# Patient Record
Sex: FEMALE | NOT HISPANIC OR LATINO | URBAN - METROPOLITAN AREA
[De-identification: names, ages, dates, MRNs, and addresses within clinical notes are randomized per-mention and may not be internally consistent; named-entity substitution may affect disease eponyms.]

---

## 2018-12-23 ENCOUNTER — OFFICE VISIT (OUTPATIENT)
Dept: RETAIL CLINIC | Facility: CLINIC | Age: 28
End: 2018-12-23

## 2018-12-23 VITALS
RESPIRATION RATE: 18 BRPM | TEMPERATURE: 98.6 F | OXYGEN SATURATION: 100 % | HEART RATE: 68 BPM | DIASTOLIC BLOOD PRESSURE: 60 MMHG | SYSTOLIC BLOOD PRESSURE: 110 MMHG

## 2018-12-23 DIAGNOSIS — H57.11 EYE PAIN, RIGHT: Primary | ICD-10-CM

## 2018-12-23 PROCEDURE — 99203 OFFICE O/P NEW LOW 30 MIN: CPT | Performed by: NURSE PRACTITIONER

## 2018-12-23 RX ORDER — PRENATAL VIT/IRON FUM/FOLIC AC 27MG-0.8MG
TABLET ORAL DAILY
COMMUNITY

## 2018-12-23 RX ORDER — POLYMYXIN B SULFATE AND TRIMETHOPRIM 1; 10000 MG/ML; [USP'U]/ML
1 SOLUTION OPHTHALMIC EVERY 4 HOURS
Qty: 10 ML | Refills: 0 | Status: SHIPPED | OUTPATIENT
Start: 2018-12-23 | End: 2018-12-27

## 2018-12-23 NOTE — PROGRESS NOTES
"Subjective   Eugene Clifton is a 28 y.o. female.     Eye Pain    The right eye is affected. The current episode started yesterday. The problem has been gradually worsening. Injury mechanism: baby \"poked finger in eye\" last night. The pain is at a severity of 3/10. The pain is mild. There is no known exposure to pink eye. She does not wear contacts. Associated symptoms include an eye discharge. Pertinent negatives include no blurred vision, itching or recent URI. She has tried nothing for the symptoms.        The following portions of the patient's history were reviewed and updated as appropriate: allergies, current medications, past family history, past medical history, past social history, past surgical history and problem list.    Review of Systems   HENT: Negative.    Eyes: Positive for pain and discharge. Negative for blurred vision and itching.   Respiratory: Negative.    Cardiovascular: Negative.        Objective   Physical Exam   Constitutional: She is cooperative. No distress.   HENT:   Head: Normocephalic.   Right Ear: Hearing, tympanic membrane, external ear and ear canal normal.   Left Ear: Hearing, tympanic membrane, external ear and ear canal normal.   Nose: Nose normal.   Mouth/Throat: Oropharynx is clear and moist.   Eyes: EOM and lids are normal. Pupils are equal, round, and reactive to light. Right eye exhibits discharge. Left eye exhibits discharge. Right conjunctiva is injected. Right eye exhibits normal extraocular motion and no nystagmus.   Right eye with small scratch on outer corner, sclera with mild redness, clear drainage.  No obvious problem with sclera or pupil, vision 20/20   Neck: Trachea normal and full passive range of motion without pain.   Cardiovascular: Normal rate, regular rhythm and normal pulses.   Pulmonary/Chest: Effort normal and breath sounds normal.   Neurological: She is alert.   Skin: Skin is warm. Capillary refill takes less than 2 seconds.   Psychiatric: She has a " normal mood and affect. Her speech is normal and behavior is normal.   Vitals reviewed.        Assessment/Plan   Eugene was seen today for eye pain.    Diagnoses and all orders for this visit:    Eye pain, right    Other orders  -     trimethoprim-polymyxin b (POLYTRIM) 98502-9.1 UNIT/ML-% ophthalmic solution; Administer 1 drop to the right eye Every 4 (Four) Hours for 4 days.    If trouble with vision, swelling, or other symptoms occur follow up with eye clinic or go to ER.

## 2018-12-23 NOTE — PATIENT INSTRUCTIONS
Bacterial Conjunctivitis  Bacterial conjunctivitis is an infection of the clear membrane that covers the white part of your eye and the inner surface of your eyelid (conjunctiva). When the blood vessels in your conjunctiva become inflamed, your eye becomes red or pink, and it will probably feel itchy. Bacterial conjunctivitis spreads very easily from person to person (is contagious). It also spreads easily from one eye to the other eye.  What are the causes?  This condition is caused by several common bacteria. You may get the infection if you come into close contact with another person who is infected. You may also come into contact with items that are contaminated with the bacteria, such as a face towel, contact lens solution, or eye makeup.  What increases the risk?  This condition is more likely to develop in people who:  · Are exposed to other people who have the infection.  · Wear contact lenses.  · Have a sinus infection.  · Have had a recent eye injury or surgery.  · Have a weak body defense system (immune system).  · Have a medical condition that causes dry eyes.    What are the signs or symptoms?  Symptoms of this condition include:  · Eye redness.  · Tearing or watery eyes.  · Itchy eyes.  · Burning feeling in your eyes.  · Thick, yellowish discharge from an eye. This may turn into a crust on the eyelid overnight and cause your eyelids to stick together.  · Swollen eyelids.  · Blurred vision.    How is this diagnosed?  Your health care provider can diagnose this condition based on your symptoms and medical history. Your health care provider may also take a sample of discharge from your eye to find the cause of your infection. This is rarely done.  How is this treated?  Treatment for this condition includes:  · Antibiotic eye drops or ointment to clear the infection more quickly and prevent the spread of infection to others.  · Oral antibiotic medicines to treat infections that do not respond to drops or  ointments, or last longer than 10 days.  · Cool, wet cloths (cool compresses) placed on the eyes.  · Artificial tears applied 2-6 times a day.    Follow these instructions at home:  Medicines  · Take or apply your antibiotic medicine as told by your health care provider. Do not stop taking or applying the antibiotic even if you start to feel better.  · Take or apply over-the-counter and prescription medicines only as told by your health care provider.  · Be very careful to avoid touching the edge of your eyelid with the eye drop bottle or the ointment tube when you apply medicines to the affected eye. This will keep you from spreading the infection to your other eye or to other people.  Managing discomfort  · Gently wipe away any drainage from your eye with a warm, wet washcloth or a cotton ball.  · Apply a cool, clean washcloth to your eye for 10-20 minutes, 3-4 times a day.  General instructions  · Do not wear contact lenses until the inflammation is gone and your health care provider says it is safe to wear them again. Ask your health care provider how to sterilize or replace your contact lenses before you use them again. Wear glasses until you can resume wearing contacts.  · Avoid wearing eye makeup until the inflammation is gone. Throw away any old eye cosmetics that may be contaminated.  · Change or wash your pillowcase every day.  · Do not share towels or washcloths. This may spread the infection.  · Wash your hands often with soap and water. Use paper towels to dry your hands.  · Avoid touching or rubbing your eyes.  · Do not drive or use heavy machinery if your vision is blurred.  Contact a health care provider if:  · You have a fever.  · Your symptoms do not get better after 10 days.  Get help right away if:  · You have a fever and your symptoms suddenly get worse.  · You have severe pain when you move your eye.  · You have facial pain, redness, or swelling.  · You have sudden loss of vision.  This  information is not intended to replace advice given to you by your health care provider. Make sure you discuss any questions you have with your health care provider.  Document Released: 12/18/2006 Document Revised: 04/27/2017 Document Reviewed: 09/29/2016  [email or web address] Interactive Patient Education © 2017 [employer]